# Patient Record
Sex: MALE | Race: BLACK OR AFRICAN AMERICAN | NOT HISPANIC OR LATINO | Employment: UNEMPLOYED | URBAN - METROPOLITAN AREA
[De-identification: names, ages, dates, MRNs, and addresses within clinical notes are randomized per-mention and may not be internally consistent; named-entity substitution may affect disease eponyms.]

---

## 2017-01-06 ENCOUNTER — HISTORICAL (OUTPATIENT)
Dept: LAB | Facility: HOSPITAL | Age: 57
End: 2017-01-06

## 2017-02-27 ENCOUNTER — HISTORICAL (OUTPATIENT)
Dept: SURGERY | Facility: HOSPITAL | Age: 57
End: 2017-02-27

## 2017-03-01 ENCOUNTER — HISTORICAL (OUTPATIENT)
Dept: ANESTHESIOLOGY | Facility: HOSPITAL | Age: 57
End: 2017-03-01

## 2017-06-05 ENCOUNTER — HISTORICAL (OUTPATIENT)
Dept: RADIOLOGY | Facility: HOSPITAL | Age: 57
End: 2017-06-05

## 2019-06-21 ENCOUNTER — HISTORICAL (OUTPATIENT)
Dept: ADMINISTRATIVE | Facility: HOSPITAL | Age: 59
End: 2019-06-21

## 2022-04-11 ENCOUNTER — HISTORICAL (OUTPATIENT)
Dept: ADMINISTRATIVE | Facility: HOSPITAL | Age: 62
End: 2022-04-11

## 2022-04-27 VITALS
WEIGHT: 211.19 LBS | SYSTOLIC BLOOD PRESSURE: 108 MMHG | HEIGHT: 69 IN | DIASTOLIC BLOOD PRESSURE: 74 MMHG | BODY MASS INDEX: 31.28 KG/M2

## 2022-05-01 ENCOUNTER — HOSPITAL ENCOUNTER (EMERGENCY)
Facility: HOSPITAL | Age: 62
Discharge: HOME | End: 2022-05-01
Attending: INTERNAL MEDICINE
Payer: MEDICAID

## 2022-05-01 VITALS
HEART RATE: 78 BPM | BODY MASS INDEX: 26.66 KG/M2 | OXYGEN SATURATION: 99 % | TEMPERATURE: 98 F | SYSTOLIC BLOOD PRESSURE: 115 MMHG | RESPIRATION RATE: 17 BRPM | DIASTOLIC BLOOD PRESSURE: 86 MMHG | WEIGHT: 180 LBS

## 2022-05-01 DIAGNOSIS — R42 DIZZINESS AFTER EXTENSION OF NECK: ICD-10-CM

## 2022-05-01 DIAGNOSIS — L02.215 PERINEAL ABSCESS, SUPERFICIAL: ICD-10-CM

## 2022-05-01 DIAGNOSIS — E86.0 DEHYDRATION: Primary | ICD-10-CM

## 2022-05-01 DIAGNOSIS — E87.6 HYPOKALEMIA: ICD-10-CM

## 2022-05-01 LAB
ALBUMIN SERPL-MCNC: 2.7 GM/DL (ref 3.4–4.8)
ALBUMIN/GLOB SERPL: 0.6 RATIO (ref 1.1–2)
ALP SERPL-CCNC: 83 UNIT/L (ref 40–150)
ALT SERPL-CCNC: 18 UNIT/L (ref 0–55)
AST SERPL-CCNC: 59 UNIT/L (ref 5–34)
BASOPHILS # BLD AUTO: 0.03 X10(3)/MCL (ref 0–0.2)
BASOPHILS NFR BLD AUTO: 0.3 %
BILIRUBIN DIRECT+TOT PNL SERPL-MCNC: 0.5 MG/DL (ref 0–0.8)
BILIRUBIN DIRECT+TOT PNL SERPL-MCNC: 1.1 MG/DL (ref 0–0.5)
BILIRUBIN DIRECT+TOT PNL SERPL-MCNC: 1.6 MG/DL
BUN SERPL-MCNC: 17 MG/DL (ref 8.4–25.7)
CALCIUM SERPL-MCNC: 9.3 MG/DL (ref 8.8–10)
CHLORIDE SERPL-SCNC: 92 MMOL/L (ref 98–107)
CO2 SERPL-SCNC: 28 MMOL/L (ref 23–31)
CREAT SERPL-MCNC: 1.81 MG/DL (ref 0.73–1.18)
EOSINOPHIL # BLD AUTO: 0.14 X10(3)/MCL (ref 0–0.9)
EOSINOPHIL NFR BLD AUTO: 1.6 %
ERYTHROCYTE [DISTWIDTH] IN BLOOD BY AUTOMATED COUNT: 13.1 % (ref 11.5–17)
GLOBULIN SER-MCNC: 4.7 GM/DL (ref 2.4–3.5)
GLUCOSE SERPL-MCNC: 119 MG/DL (ref 82–115)
HCT VFR BLD AUTO: 26.2 % (ref 42–52)
HGB BLD-MCNC: 9.4 GM/DL (ref 14–18)
IMM GRANULOCYTES # BLD AUTO: 0.26 X10(3)/MCL (ref 0–0.02)
IMM GRANULOCYTES NFR BLD AUTO: 3 % (ref 0–0.43)
LACTATE SERPL-SCNC: 1.6 MMOL/L (ref 0.5–2.2)
LYMPHOCYTES # BLD AUTO: 1.29 X10(3)/MCL (ref 0.6–4.6)
LYMPHOCYTES NFR BLD AUTO: 14.6 %
MCH RBC QN AUTO: 37.3 PG (ref 27–31)
MCHC RBC AUTO-ENTMCNC: 35.9 MG/DL (ref 33–36)
MCV RBC AUTO: 104 FL (ref 80–94)
MONOCYTES # BLD AUTO: 0.42 X10(3)/MCL (ref 0.1–1.3)
MONOCYTES NFR BLD AUTO: 4.8 %
NEUTROPHILS # BLD AUTO: 6.7 X10(3)/MCL (ref 2.1–9.2)
NEUTROPHILS NFR BLD AUTO: 75.7 %
PLATELET # BLD AUTO: 309 X10(3)/MCL (ref 130–400)
PMV BLD AUTO: 10.7 FL (ref 9.4–12.4)
POTASSIUM SERPL-SCNC: 2.4 MMOL/L (ref 3.5–5.1)
PROT SERPL-MCNC: 7.4 GM/DL (ref 5.8–7.6)
RBC # BLD AUTO: 2.52 X10(6)/MCL (ref 4.7–6.1)
SODIUM SERPL-SCNC: 136 MMOL/L (ref 136–145)
TROPONIN I SERPL-MCNC: <0.01 NG/ML (ref 0–0.04)
WBC # SPEC AUTO: 8.9 X10(3)/MCL (ref 4.5–11.5)

## 2022-05-01 PROCEDURE — 84484 ASSAY OF TROPONIN QUANT: CPT

## 2022-05-01 PROCEDURE — 25000003 PHARM REV CODE 250: Performed by: INTERNAL MEDICINE

## 2022-05-01 PROCEDURE — 36415 COLL VENOUS BLD VENIPUNCTURE: CPT | Performed by: INTERNAL MEDICINE

## 2022-05-01 PROCEDURE — 85025 COMPLETE CBC W/AUTO DIFF WBC: CPT | Performed by: INTERNAL MEDICINE

## 2022-05-01 PROCEDURE — 99285 EMERGENCY DEPT VISIT HI MDM: CPT | Mod: 25

## 2022-05-01 PROCEDURE — 25500020 PHARM REV CODE 255: Performed by: INTERNAL MEDICINE

## 2022-05-01 PROCEDURE — 96361 HYDRATE IV INFUSION ADD-ON: CPT

## 2022-05-01 PROCEDURE — 87040 BLOOD CULTURE FOR BACTERIA: CPT | Performed by: INTERNAL MEDICINE

## 2022-05-01 PROCEDURE — 83605 ASSAY OF LACTIC ACID: CPT | Performed by: INTERNAL MEDICINE

## 2022-05-01 PROCEDURE — 93005 ELECTROCARDIOGRAM TRACING: CPT

## 2022-05-01 PROCEDURE — 80053 COMPREHEN METABOLIC PANEL: CPT | Performed by: INTERNAL MEDICINE

## 2022-05-01 PROCEDURE — 96360 HYDRATION IV INFUSION INIT: CPT | Mod: 59

## 2022-05-01 PROCEDURE — 84484 ASSAY OF TROPONIN QUANT: CPT | Performed by: INTERNAL MEDICINE

## 2022-05-01 RX ORDER — SODIUM CHLORIDE 9 MG/ML
1000 INJECTION, SOLUTION INTRAVENOUS
Status: COMPLETED | OUTPATIENT
Start: 2022-05-01 | End: 2022-05-01

## 2022-05-01 RX ORDER — DOXYCYCLINE 100 MG/1
100 CAPSULE ORAL 2 TIMES DAILY
Qty: 20 CAPSULE | Refills: 0 | Status: SHIPPED | OUTPATIENT
Start: 2022-05-01 | End: 2022-05-11

## 2022-05-01 RX ORDER — NAPROXEN 500 MG/1
500 TABLET ORAL 2 TIMES DAILY WITH MEALS
Qty: 30 TABLET | Refills: 0 | Status: SHIPPED | OUTPATIENT
Start: 2022-05-01 | End: 2022-05-16

## 2022-05-01 RX ADMIN — SODIUM CHLORIDE 1000 ML: 9 INJECTION, SOLUTION INTRAVENOUS at 01:05

## 2022-05-01 RX ADMIN — SODIUM CHLORIDE 1500 ML: 9 INJECTION, SOLUTION INTRAVENOUS at 12:05

## 2022-05-01 RX ADMIN — POTASSIUM BICARBONATE 40 MEQ: 782 TABLET, EFFERVESCENT ORAL at 12:05

## 2022-05-01 RX ADMIN — SODIUM CHLORIDE 1000 ML: 9 INJECTION, SOLUTION INTRAVENOUS at 11:05

## 2022-05-01 RX ADMIN — IOPAMIDOL 100 ML: 755 INJECTION, SOLUTION INTRAVENOUS at 04:05

## 2022-05-01 NOTE — DISCHARGE INSTRUCTIONS
[facility] Primary Care Providers        [facility]  MD Mary Martinez, MICHOACANO  [address]  [place], [place] [address] [phone] [facility]  Annabella Mosquera NP  [address] [place] [address]  [phone]   [facility]  MICHOACANO Kwok MD Mark Dawson, MD Danielle Duhon, MD  [address] [place] [address]       [phone] [facility]  [address] [place]  [address]  [phone]   [facility]  [address] [facility] [address]  [phone]    [facility]  Mariaelena Simms, MICHOACANO  [address] [place] [address]  [phone] [facility]  Marlen Estrada NP  [address] [place], [place] [address] [phone]   [facility]  MD Tanya Aranda MD, MD Colleen Juarez MD  [address]  [place], [place] [address] [phone] [facility] [facility]  MD Rudy Lyons MD  [address], [place] [address] [phone]   Morris Brown NP  [address], [place] [address] [phone] [facility]  MICHOACANO [place], MICHOACANO  [address] [place], [place]  [phone]   [facility]  MD Ade Reed MD Paul B. Stringfellow, MD  [address] [place] [address]  [phone] [facility]   Tony Zambrano MD  [address] [facility], [place] [address] [phone]   [facility]  [address] [place], [place] [address] [phone]  TOBIAS Gallegos MD  [address] [place], [place] [address] [phone]   [facility]  Annabella Lion NP  [address] [address] [phone] Reji Barth,  NP  [address] [place] [address]  [phone]   [facility]  [address] [place]  [address]  [phone] [facility]  [address]  [place]  [address]  [phone]   [facility]   [address] [place] [address]  [phone] Jonah Haskins MD  [address] [place]  [address] [place] [address]  [phone]   [facility]  [address] [place] [address]  [phone] [facility]  [address] [place]  [address]  [phone]       [facility] Pediatric Care Providers    [facility]  DO Oliverio Bunch MD  [address] [place] [address]  [phone] Kelly Mckeon MD  [address] [place] [address]  [phone]   [facility]  [place]   Rachael Glover, OMA  [address] [place] [address]  [phone] Marisa Thompson MD  [address] [place] [address]  [phone]   Revised /14/2021         You need to see one of the surgeons to reevaluate, do further workup and treatment as needed    Take medicines as prescribed    Talk to your family doctor to refer you to the surgeon.    The exam and treatment you received in Emergency Room was for an urgent problem and NOT INTENDED AS COMPLETE CARE. It is important that you FOLLOW UP with a doctor for ongoing care. If your symptoms become WORSE or you DO NOT IMPROVE and you are unable to reach your health care provider, you should RETURN to the emergency department. The Emergency Room doctor has provided a PRELIMINARY INTERPRETATION of all your STUDIES. A final interpretation may be done after you are discharged. IF A CHANGE in your diagnosis or treatment is needed WE WILL CONTACT YOU. It is critical that we have a CURRENT PHONE NUMBER FOR YOU.    Jonathan Bernardo MD  [phone]    Lynne Bernardo MD  [phone]    Denise Ayers MD.  [phone]

## 2022-05-01 NOTE — ED PROVIDER NOTES
Encounter Date: 5/1/2022       History     Chief Complaint   Patient presents with    Hypotension    Weakness     Pt recently changed diet and continued to take BP medication without checking BP.      Patient with history of hypertension, CVA, surgery on his perineum in January, which according to him had something to do with the prostate, and had an open wound in the perineum which has been bleeding, according to patient yesterday went for fishing and last night started having some dizziness and lightheadedness, and when he stands up, he gets dizzy so he called the ambulance to come to the emergency room.  According to medics his blood pressure was 70 systolic,        Review of patient's allergies indicates:  No Known Allergies  Past Medical History:   Diagnosis Date    Hypertension      Past Surgical History:   Procedure Laterality Date    PROSTATE SURGERY       History reviewed. No pertinent family history.  Social History     Tobacco Use    Smoking status: Current Every Day Smoker     Packs/day: 1.00    Smokeless tobacco: Never Used   Substance Use Topics    Alcohol use: Not Currently     Comment: occasionally     Review of Systems   HENT: Negative for trouble swallowing and voice change.    Eyes: Negative for visual disturbance.   Respiratory: Negative for cough and shortness of breath.    Cardiovascular: Negative for chest pain.   Gastrointestinal: Negative for abdominal pain, diarrhea and vomiting.   Genitourinary: Negative for dysuria and hematuria.   Musculoskeletal: Negative for gait problem.        No Deformity   Skin: Negative for color change and rash.   Neurological: Negative for headaches.   Psychiatric/Behavioral: Negative for behavioral problems and sleep disturbance.   All other systems reviewed and are negative.      Physical Exam     Initial Vitals   BP Pulse Resp Temp SpO2   05/01/22 1016 05/01/22 1016 05/01/22 1016 05/01/22 1020 05/01/22 1016   (!) 76/42 104 16 98.4 °F (36.9 °C) 100 %       MAP       --                Physical Exam    Nursing note and vitals reviewed.  Constitutional: No distress.   HENT:   Head: Atraumatic.   Eyes: EOM are normal.   Cardiovascular: Normal heart sounds.   Pulmonary/Chest: Breath sounds normal.   Abdominal: Abdomen is soft. Bowel sounds are normal.   Genitourinary: Uncircumcised.       Musculoskeletal:         General: Normal range of motion.      Cervical back: No bony tenderness.     Neurological: He is alert.   Speech Normal   Skin: Skin is warm and dry.   Psychiatric: He has a normal mood and affect.   Pleasant         ED Course   Procedures  Labs Reviewed   COMPREHENSIVE METABOLIC PANEL - Abnormal; Notable for the following components:       Result Value    Potassium Level 2.4 (*)     Chloride 92 (*)     Glucose Level 119 (*)     Creatinine 1.81 (*)     Albumin Level 2.7 (*)     Globulin 4.7 (*)     Albumin/Globulin Ratio 0.6 (*)     Bilirubin Total 1.6 (*)     Bilirubin Direct 1.1 (*)     Aspartate Aminotransferase 59 (*)     All other components within normal limits   CBC WITH DIFFERENTIAL - Abnormal; Notable for the following components:    RBC 2.52 (*)     Hgb 9.4 (*)     Hct 26.2 (*)     .0 (*)     MCH 37.3 (*)     IG# 0.26 (*)     IG% 3.0 (*)     All other components within normal limits   TROPONIN I - Normal   LACTIC ACID, PLASMA - Normal   BLOOD CULTURE OLG   BLOOD CULTURE OLG   CBC W/ AUTO DIFFERENTIAL    Narrative:     The following orders were created for panel order CBC auto differential.  Procedure                               Abnormality         Status                     ---------                               -----------         ------                     CBC with Differential[[id number]]        Abnormal            Edited Result - FINAL        Please view results for these tests on the individual orders.   CREATININE, SERUM     EKG Readings: (Independently Interpreted)   Initial Reading: No STEMI. Rhythm: Normal Sinus Rhythm. Heart Rate:  76. Ectopy: No Ectopy. Conduction: Normal. Axis: Normal.       Imaging Results          CT Abdomen Pelvis With Contrast (Final result)  Result time 05/01/22 16:08:32    Final result by Chip Aceves MD (05/01/22 16:08:32)                 Impression:      1. Bladder wall thickening.  Differential considerations include physiologic underdistention of the bladder, cystitis and chronic obstruction.  2. No other evidence of acute abdominopelvic process.      Electronically signed by: Chip Aceves MD  Date:    05/01/2022  Time:    16:08             Narrative:    EXAMINATION:  CT ABDOMEN PELVIS WITH CONTRAST    CLINICAL HISTORY:  Abdominal abscess    TECHNIQUE:  Axial CT images were obtained through the abdomen and pelvis after the IV administration of 100 mL of Isovue 370. coronal and sagittal reconstructions submitted and interpreted.  Automated exposure control, dose radiation lowering technique, was utilized.    COMPARISON:  CT abdomen and pelvis from 09/10/2019    FINDINGS:  Heart size is normal.  There is scattered dependent atelectasis and/or scarring.  Chronic right lateral rib fractures are present.  Unremarkable gallbladder.  The liver, spleen, pancreas, adrenal glands and kidneys appear normal.    The bowel is nonobstructed.  A normal appendix is present with air at the tip of the appendix.  Air and stool are present within the colon.  There is scattered colonic diverticuli.  No diverticulitis.    No abdominal or pelvic lymphadenopathy is present.  Mild atherosclerosis of the thoracic aorta and its branches.  No ascites.  No suspicious peritoneal nodule.  There is mild, diffuse bladder wall thickening.  This may be partly secondary to under distention of the bladder.  No suspicious osteolytic or osteoblastic lesion is present.                                 Medications   0.9%  NaCl infusion (0 mLs Intravenous Stopped 5/1/22 1255)   sodium chloride 0.9% bolus 2,448 mL (0 mLs Intravenous Stopped 5/1/22  1341)   potassium bicarbonate disintegrating tablet 40 mEq (40 mEq Oral Given 5/1/22 1244)   0.9%  NaCl infusion (0 mLs Intravenous Stopped 5/1/22 1639)   iopamidoL (ISOVUE-370) injection 100 mL (100 mLs Intravenous Given 5/1/22 1602)                 ED Course as of 05/01/22 1645   Sun May 01, 2022   1238 Patient's blood pressure had come up, he is responding to fluid bolus, his white blood cell count is normal, his chemistry showed hyponatremia, he is getting 30 mL/kg bolus, lactic acid is normal,     [GQ]   1628 Patient feels better, is not feeling dizzy anymore, apparently the opening in the perineum has been followed up a couple of months now for a couple of months he had surgery and had I and D done, and it does not show any signs of any major infection at this time, CT scan does not show any other acute abnormality in the area except for that in duration which I am noting on examination, I will go ahead and put him on antibiotic doxycycline 100 mg twice a day and I have advised him that he must talk to his family doctor about further evaluation and possibly he should go and see the physician/surgeon who did the I and D on him further evaluation.  Patient verbalizes understanding and is willing to go home [GQ]   1633 . [GQ]      ED Course User Index  [GQ] Donovan Barraza MD             Clinical Impression:   Final diagnoses:  [R42] Dizziness after extension of neck  [E86.0] Dehydration (Primary)  [E87.6] Hypokalemia  [L02.215] Perineal abscess, superficial          ED Disposition Condition    Discharge Stable        ED Prescriptions     Medication Sig Dispense Start Date End Date Auth. Provider    naproxen (NAPROSYN) 500 MG tablet Take 1 tablet (500 mg total) by mouth 2 (two) times daily with meals. for 15 days 30 tablet 5/1/2022 5/16/2022 Donovan Barraza MD    doxycycline (VIBRAMYCIN) 100 MG Cap Take 1 capsule (100 mg total) by mouth 2 (two) times daily. for 10 days 20 capsule 5/1/2022 5/11/2022 Donovan MATOS  MD Madi        Follow-up Information    None          Donovan Barraza MD  05/01/22 6379
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
## 2022-05-08 LAB
BACTERIA BLD CULT: NORMAL
BACTERIA BLD CULT: NORMAL

## 2022-09-08 ENCOUNTER — HOSPITAL ENCOUNTER (OUTPATIENT)
Facility: HOSPITAL | Age: 62
Discharge: HOME | End: 2022-09-09
Attending: FAMILY MEDICINE | Admitting: INTERNAL MEDICINE
Payer: COMMERCIAL

## 2022-09-08 DIAGNOSIS — Z00.00 ROUTINE CHECK-UP: ICD-10-CM

## 2022-09-08 DIAGNOSIS — H53.9 VISION ABNORMALITIES: ICD-10-CM

## 2022-09-08 DIAGNOSIS — I63.9 ACUTE CVA (CEREBROVASCULAR ACCIDENT): Primary | ICD-10-CM

## 2022-09-08 DIAGNOSIS — R07.9 CHEST PAIN: ICD-10-CM

## 2022-09-08 DIAGNOSIS — I10 HYPERTENSION: ICD-10-CM

## 2022-09-08 PROBLEM — E53.8 FOLIC ACID DEFICIENCY: Status: ACTIVE | Noted: 2021-03-11

## 2022-09-08 PROBLEM — E78.49 OTHER HYPERLIPIDEMIA: Status: ACTIVE | Noted: 2021-03-17

## 2022-09-08 PROBLEM — E53.8 VITAMIN B12 DEFICIENCY: Status: ACTIVE | Noted: 2021-03-11

## 2022-09-08 PROBLEM — H54.62 VISION LOSS OF LEFT EYE: Status: ACTIVE | Noted: 2021-03-17

## 2022-09-08 LAB
ALBUMIN SERPL-MCNC: 4.1 GM/DL (ref 3.4–4.8)
ALBUMIN/GLOB SERPL: 1.1 RATIO (ref 1.1–2)
ALP SERPL-CCNC: 68 UNIT/L (ref 40–150)
ALT SERPL-CCNC: 15 UNIT/L (ref 0–55)
APPEARANCE UR: CLEAR
AST SERPL-CCNC: 20 UNIT/L (ref 5–34)
BACTERIA #/AREA URNS AUTO: ABNORMAL /HPF
BASOPHILS # BLD AUTO: 0.05 X10(3)/MCL (ref 0–0.2)
BASOPHILS NFR BLD AUTO: 0.8 %
BILIRUB UR QL STRIP.AUTO: NEGATIVE MG/DL
BILIRUBIN DIRECT+TOT PNL SERPL-MCNC: 0.6 MG/DL
BUN SERPL-MCNC: 14.7 MG/DL (ref 8.4–25.7)
CALCIUM SERPL-MCNC: 10.4 MG/DL (ref 8.8–10)
CHLORIDE SERPL-SCNC: 101 MMOL/L (ref 98–107)
CO2 SERPL-SCNC: 28 MMOL/L (ref 23–31)
COLOR UR AUTO: YELLOW
CREAT SERPL-MCNC: 0.89 MG/DL (ref 0.73–1.18)
EOSINOPHIL # BLD AUTO: 0.37 X10(3)/MCL (ref 0–0.9)
EOSINOPHIL NFR BLD AUTO: 5.7 %
ERYTHROCYTE [DISTWIDTH] IN BLOOD BY AUTOMATED COUNT: 13.2 % (ref 11.5–17)
EST. AVERAGE GLUCOSE BLD GHB EST-MCNC: 116.9 MG/DL
FOLATE SERPL-MCNC: 9.5 NG/ML (ref 7–31.4)
GFR SERPLBLD CREATININE-BSD FMLA CKD-EPI: >60 MLS/MIN/1.73/M2
GLOBULIN SER-MCNC: 3.8 GM/DL (ref 2.4–3.5)
GLUCOSE SERPL-MCNC: 92 MG/DL (ref 82–115)
GLUCOSE UR QL STRIP.AUTO: NORMAL MG/DL
HBA1C MFR BLD: 5.7 %
HCT VFR BLD AUTO: 43.4 % (ref 42–52)
HGB BLD-MCNC: 14.2 GM/DL (ref 14–18)
HIV 1+2 AB+HIV1 P24 AG SERPL QL IA: NONREACTIVE
HYALINE CASTS #/AREA URNS LPF: ABNORMAL /LPF
IMM GRANULOCYTES # BLD AUTO: 0.02 X10(3)/MCL (ref 0–0.04)
IMM GRANULOCYTES NFR BLD AUTO: 0.3 %
KETONES UR QL STRIP.AUTO: NEGATIVE MG/DL
LACTATE SERPL-SCNC: 1.5 MMOL/L (ref 0.5–2.2)
LEUKOCYTE ESTERASE UR QL STRIP.AUTO: 250 UNIT/L
LYMPHOCYTES # BLD AUTO: 2.81 X10(3)/MCL (ref 0.6–4.6)
LYMPHOCYTES NFR BLD AUTO: 43.2 %
MCH RBC QN AUTO: 31.2 PG (ref 27–31)
MCHC RBC AUTO-ENTMCNC: 32.7 MG/DL (ref 33–36)
MCV RBC AUTO: 95.4 FL (ref 80–94)
MONOCYTES # BLD AUTO: 0.87 X10(3)/MCL (ref 0.1–1.3)
MONOCYTES NFR BLD AUTO: 13.4 %
MUCOUS THREADS URNS QL MICRO: ABNORMAL /LPF
NEUTROPHILS # BLD AUTO: 2.4 X10(3)/MCL (ref 2.1–9.2)
NEUTROPHILS NFR BLD AUTO: 36.6 %
NITRITE UR QL STRIP.AUTO: NEGATIVE
NRBC BLD AUTO-RTO: 0 %
PH UR STRIP.AUTO: 5.5 [PH]
PLATELET # BLD AUTO: 180 X10(3)/MCL (ref 130–400)
PLATELETS.RETICULATED NFR BLD AUTO: 14.6 % (ref 0.9–11.2)
PMV BLD AUTO: 12.8 FL (ref 7.4–10.4)
POTASSIUM SERPL-SCNC: 5 MMOL/L (ref 3.5–5.1)
PROT SERPL-MCNC: 7.9 GM/DL (ref 5.8–7.6)
PROT UR QL STRIP.AUTO: NEGATIVE MG/DL
RBC # BLD AUTO: 4.55 X10(6)/MCL (ref 4.7–6.1)
RBC #/AREA URNS AUTO: ABNORMAL /HPF
RBC UR QL AUTO: NEGATIVE UNIT/L
SODIUM SERPL-SCNC: 139 MMOL/L (ref 136–145)
SP GR UR STRIP.AUTO: 1.03
SQUAMOUS #/AREA URNS LPF: ABNORMAL /HPF
T PALLIDUM AB SER QL: NONREACTIVE
T4 FREE SERPL-MCNC: 0.73 NG/DL (ref 0.7–1.48)
TSH SERPL-ACNC: 0.82 UIU/ML (ref 0.35–4.94)
UROBILINOGEN UR STRIP-ACNC: NORMAL MG/DL
VIT B12 SERPL-MCNC: 428 PG/ML (ref 213–816)
WBC # SPEC AUTO: 6.5 X10(3)/MCL (ref 4.5–11.5)
WBC #/AREA URNS AUTO: ABNORMAL /HPF

## 2022-09-08 PROCEDURE — 25000003 PHARM REV CODE 250

## 2022-09-08 PROCEDURE — 85025 COMPLETE CBC W/AUTO DIFF WBC: CPT | Performed by: FAMILY MEDICINE

## 2022-09-08 PROCEDURE — 82607 VITAMIN B-12: CPT

## 2022-09-08 PROCEDURE — 83605 ASSAY OF LACTIC ACID: CPT

## 2022-09-08 PROCEDURE — 84439 ASSAY OF FREE THYROXINE: CPT

## 2022-09-08 PROCEDURE — 99285 EMERGENCY DEPT VISIT HI MDM: CPT | Mod: 25

## 2022-09-08 PROCEDURE — 36415 COLL VENOUS BLD VENIPUNCTURE: CPT | Performed by: FAMILY MEDICINE

## 2022-09-08 PROCEDURE — 87088 URINE BACTERIA CULTURE: CPT | Performed by: FAMILY MEDICINE

## 2022-09-08 PROCEDURE — 80053 COMPREHEN METABOLIC PANEL: CPT | Performed by: FAMILY MEDICINE

## 2022-09-08 PROCEDURE — G0378 HOSPITAL OBSERVATION PER HR: HCPCS

## 2022-09-08 PROCEDURE — 93005 ELECTROCARDIOGRAM TRACING: CPT

## 2022-09-08 PROCEDURE — 87389 HIV-1 AG W/HIV-1&-2 AB AG IA: CPT

## 2022-09-08 PROCEDURE — 83036 HEMOGLOBIN GLYCOSYLATED A1C: CPT

## 2022-09-08 PROCEDURE — 80061 LIPID PANEL: CPT

## 2022-09-08 PROCEDURE — 84443 ASSAY THYROID STIM HORMONE: CPT

## 2022-09-08 PROCEDURE — 25500020 PHARM REV CODE 255

## 2022-09-08 PROCEDURE — 84100 ASSAY OF PHOSPHORUS: CPT

## 2022-09-08 PROCEDURE — 96372 THER/PROPH/DIAG INJ SC/IM: CPT

## 2022-09-08 PROCEDURE — 86780 TREPONEMA PALLIDUM: CPT

## 2022-09-08 PROCEDURE — 63600175 PHARM REV CODE 636 W HCPCS

## 2022-09-08 PROCEDURE — 81001 URINALYSIS AUTO W/SCOPE: CPT | Performed by: FAMILY MEDICINE

## 2022-09-08 PROCEDURE — 82746 ASSAY OF FOLIC ACID SERUM: CPT

## 2022-09-08 RX ORDER — IBUPROFEN 200 MG
24 TABLET ORAL
Status: DISCONTINUED | OUTPATIENT
Start: 2022-09-08 | End: 2022-09-09 | Stop reason: HOSPADM

## 2022-09-08 RX ORDER — LISINOPRIL 10 MG/1
40 TABLET ORAL DAILY
Status: DISCONTINUED | OUTPATIENT
Start: 2022-09-09 | End: 2022-09-08

## 2022-09-08 RX ORDER — HYDROCHLOROTHIAZIDE 12.5 MG/1
12.5 CAPSULE ORAL DAILY
Status: ON HOLD | COMMUNITY
Start: 2022-06-15 | End: 2022-09-09 | Stop reason: SDUPTHER

## 2022-09-08 RX ORDER — ATORVASTATIN CALCIUM 40 MG/1
1 TABLET, FILM COATED ORAL DAILY
COMMUNITY
Start: 2022-06-15 | End: 2022-09-08 | Stop reason: SDUPTHER

## 2022-09-08 RX ORDER — ENOXAPARIN SODIUM 100 MG/ML
40 INJECTION SUBCUTANEOUS EVERY 24 HOURS
Status: DISCONTINUED | OUTPATIENT
Start: 2022-09-08 | End: 2022-09-09 | Stop reason: HOSPADM

## 2022-09-08 RX ORDER — HYDROCHLOROTHIAZIDE 12.5 MG/1
12.5 TABLET ORAL DAILY
Status: DISCONTINUED | OUTPATIENT
Start: 2022-09-08 | End: 2022-09-08

## 2022-09-08 RX ORDER — ASPIRIN 325 MG
325 TABLET ORAL ONCE
Status: COMPLETED | OUTPATIENT
Start: 2022-09-08 | End: 2022-09-08

## 2022-09-08 RX ORDER — LISINOPRIL 10 MG/1
40 TABLET ORAL DAILY
Status: DISCONTINUED | OUTPATIENT
Start: 2022-09-08 | End: 2022-09-09 | Stop reason: HOSPADM

## 2022-09-08 RX ORDER — HYDRALAZINE HYDROCHLORIDE 20 MG/ML
10 INJECTION INTRAMUSCULAR; INTRAVENOUS
Status: DISCONTINUED | OUTPATIENT
Start: 2022-09-08 | End: 2022-09-09 | Stop reason: HOSPADM

## 2022-09-08 RX ORDER — METOPROLOL TARTRATE 25 MG/1
25 TABLET, FILM COATED ORAL 2 TIMES DAILY
Status: DISCONTINUED | OUTPATIENT
Start: 2022-09-08 | End: 2022-09-09 | Stop reason: HOSPADM

## 2022-09-08 RX ORDER — LISINOPRIL 40 MG/1
1 TABLET ORAL DAILY
Status: ON HOLD | COMMUNITY
Start: 2022-06-15 | End: 2022-09-09 | Stop reason: SDUPTHER

## 2022-09-08 RX ORDER — NAPROXEN SODIUM 220 MG/1
81 TABLET, FILM COATED ORAL DAILY
Qty: 30 TABLET | Refills: 3 | Status: SHIPPED | OUTPATIENT
Start: 2022-09-09 | End: 2022-09-09 | Stop reason: SDUPTHER

## 2022-09-08 RX ORDER — METOPROLOL SUCCINATE 25 MG/1
25 TABLET, EXTENDED RELEASE ORAL DAILY
Status: DISCONTINUED | OUTPATIENT
Start: 2022-09-09 | End: 2022-09-08

## 2022-09-08 RX ORDER — NALOXONE HCL 0.4 MG/ML
0.02 VIAL (ML) INJECTION
Status: DISCONTINUED | OUTPATIENT
Start: 2022-09-08 | End: 2022-09-09 | Stop reason: HOSPADM

## 2022-09-08 RX ORDER — METOPROLOL TARTRATE 25 MG/1
25 TABLET, FILM COATED ORAL 2 TIMES DAILY
Qty: 60 TABLET | Refills: 11 | Status: SHIPPED | OUTPATIENT
Start: 2022-09-08 | End: 2022-09-09 | Stop reason: SDUPTHER

## 2022-09-08 RX ORDER — ACETAMINOPHEN 325 MG/1
650 TABLET ORAL ONCE
Status: COMPLETED | OUTPATIENT
Start: 2022-09-08 | End: 2022-09-08

## 2022-09-08 RX ORDER — ATORVASTATIN CALCIUM 40 MG/1
40 TABLET, FILM COATED ORAL DAILY
Status: DISCONTINUED | OUTPATIENT
Start: 2022-09-08 | End: 2022-09-09 | Stop reason: HOSPADM

## 2022-09-08 RX ORDER — ATORVASTATIN CALCIUM 40 MG/1
40 TABLET, FILM COATED ORAL DAILY
Status: ON HOLD | COMMUNITY
Start: 2022-06-15 | End: 2022-09-09 | Stop reason: SDUPTHER

## 2022-09-08 RX ORDER — NAPROXEN SODIUM 220 MG/1
81 TABLET, FILM COATED ORAL DAILY
Status: DISCONTINUED | OUTPATIENT
Start: 2022-09-09 | End: 2022-09-09 | Stop reason: HOSPADM

## 2022-09-08 RX ORDER — SODIUM CHLORIDE 0.9 % (FLUSH) 0.9 %
10 SYRINGE (ML) INJECTION EVERY 12 HOURS PRN
Status: DISCONTINUED | OUTPATIENT
Start: 2022-09-08 | End: 2022-09-09 | Stop reason: HOSPADM

## 2022-09-08 RX ORDER — GLUCAGON 1 MG
1 KIT INJECTION
Status: DISCONTINUED | OUTPATIENT
Start: 2022-09-08 | End: 2022-09-09 | Stop reason: HOSPADM

## 2022-09-08 RX ORDER — HYDROCHLOROTHIAZIDE 12.5 MG/1
12.5 TABLET ORAL DAILY
Status: DISCONTINUED | OUTPATIENT
Start: 2022-09-09 | End: 2022-09-08

## 2022-09-08 RX ORDER — HYDROCHLOROTHIAZIDE 12.5 MG/1
12.5 TABLET ORAL DAILY
Status: DISCONTINUED | OUTPATIENT
Start: 2022-09-08 | End: 2022-09-09 | Stop reason: HOSPADM

## 2022-09-08 RX ORDER — AMLODIPINE BESYLATE 10 MG/1
10 TABLET ORAL DAILY
Status: DISCONTINUED | OUTPATIENT
Start: 2022-09-09 | End: 2022-09-08

## 2022-09-08 RX ORDER — AMLODIPINE BESYLATE 10 MG/1
10 TABLET ORAL DAILY
Qty: 30 TABLET | Refills: 11 | Status: SHIPPED | OUTPATIENT
Start: 2022-09-09 | End: 2022-09-09 | Stop reason: SDUPTHER

## 2022-09-08 RX ORDER — AMLODIPINE BESYLATE 10 MG/1
10 TABLET ORAL DAILY
Status: DISCONTINUED | OUTPATIENT
Start: 2022-09-08 | End: 2022-09-09 | Stop reason: HOSPADM

## 2022-09-08 RX ORDER — IBUPROFEN 200 MG
16 TABLET ORAL
Status: DISCONTINUED | OUTPATIENT
Start: 2022-09-08 | End: 2022-09-09 | Stop reason: HOSPADM

## 2022-09-08 RX ADMIN — ASPIRIN 325 MG ORAL TABLET 325 MG: 325 PILL ORAL at 01:09

## 2022-09-08 RX ADMIN — METOPROLOL TARTRATE 25 MG: 25 TABLET, FILM COATED ORAL at 08:09

## 2022-09-08 RX ADMIN — AMLODIPINE BESYLATE 10 MG: 10 TABLET ORAL at 03:09

## 2022-09-08 RX ADMIN — LISINOPRIL 40 MG: 10 TABLET ORAL at 03:09

## 2022-09-08 RX ADMIN — ATORVASTATIN CALCIUM 40 MG: 40 TABLET, FILM COATED ORAL at 03:09

## 2022-09-08 RX ADMIN — IOHEXOL 100 ML: 350 INJECTION, SOLUTION INTRAVENOUS at 02:09

## 2022-09-08 RX ADMIN — ACETAMINOPHEN 650 MG: 325 TABLET ORAL at 08:09

## 2022-09-08 RX ADMIN — HYDROCHLOROTHIAZIDE 12.5 MG: 12.5 TABLET ORAL at 03:09

## 2022-09-08 RX ADMIN — ENOXAPARIN SODIUM 40 MG: 40 INJECTION SUBCUTANEOUS at 05:09
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
## 2022-09-08 NOTE — CONSULTS
"Consultation Report  Ophthalmology Service    Date: 09/08/2022    Chief complaint/Reason for Consult: headache with blurry vision OD     History of Present Illness: Paresh Shelton is a 61 y.o. male with history of remote CVA in 3/2021, blind left eye. He presents with right sided headache and blurry vision in the right eye. He reports 2 days of symptoms, describing a blurry "shade" over his visual field of the right eye.    Patient denies any flashes, floaters in visual field, and ocular discomfort OU.    POcularHx: Denies past ocular surgeries. 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    Current eye gtts: Denies     Family Hx: Denies family history of glaucoma, macular degeneration, or blindness. family history is not on file.     PMHx:  has no past medical history on file.     PSurgHx:  has no past surgical history on file.     Home Medications:   Prior to Admission medications    Medication Sig Start Date End Date Taking? Authorizing Provider   lisinopriL (PRINIVIL,ZESTRIL) 40 MG tablet Take 1 tablet by mouth once daily. 6/15/22  Yes Historical Provider   atorvastatin (LIPITOR) 40 MG tablet Take 1 tablet by mouth once daily. 6/15/22 9/8/22 Yes Historical Provider   atorvastatin (LIPITOR) 40 MG tablet Take 40 mg by mouth once daily. 6/15/22   Historical Provider   hydroCHLOROthiazide (MICROZIDE) 12.5 mg capsule Take 12.5 mg by mouth once daily. 6/15/22   Historical Provider        Medications this encounter:    amLODIPine  10 mg Oral Daily    [START ON 9/9/2022] aspirin  81 mg Oral Daily    atorvastatin  40 mg Oral Daily    enoxaparin  40 mg Subcutaneous Daily    hydroCHLOROthiazide  12.5 mg Oral Daily    lisinopriL  40 mg Oral Daily    metoprolol tartrate  25 mg Oral BID       Allergies: has No Known Allergies.     Social:  reports that he has been smoking cigarettes. He has never used smokeless tobacco.     ROS: As per HPI    Ocular examination/Dilated " fundus examination:  Base Eye Exam       Visual Acuity (Snellen - Linear)         Right Left    Dist sc 20/40 +1 NLP              Tonometry (Tonopen, 5:41 PM)         Right Left    Pressure 12 14              Pupils         Dark Light Shape React APD    Right 2 1 Round Brisk None    Left 2 2 Round Minimal 4+              Visual Fields         Right Left    Restrictions Total superior nasal, inferior nasal deficiencies Total superior temporal, inferior temporal, superior nasal, inferior nasal deficiencies              Extraocular Movement         Right Left     Full Full   Mild sensory XT OS             Dilation       Both eyes: 1% Mydriacyl, 2.5% Phenylephrine @ 5:42 PM                  Slit Lamp and Fundus Exam       External Exam         Right Left    External Normal Normal              Pen Light Exam         Right Left    Lids/Lashes Normal Normal    Conjunctiva/Sclera CAM CAM    Cornea Clear Clear    Anterior Chamber Deep and formed Deep and formed    Iris Round and reactive Round and reactive    Lens Clear Clear    Anterior Vitreous Normal Normal              Fundus Exam         Right Left    Disc pink and sharp 3-4+ pallor    C/D Ratio 0.7 0.9    Macula Flat Flat, atrophied    Vessels tortuous highly attenuated    Periphery Flat 360, no holes/tears/detachments Flat 360, no holes/tears/detachments. Atrophy.                  CT Head wo Contrast (9/8/22):  Impression (radiologist read): Prominent remote appearing lacunar infarcts of the basal ganglia.  Small remote cortical infarct right occipital lobe.  No definite acute intracranial abnormality.    CTA Head and Neck (9/8/22):  FINDINGS:  Carotid arteries are assessed in accordance with the NASCET criteria.  Aortic arch is remarkable for mild calcified plaques without aneurysmal dilatation or dissection.  The brachiocephalic trunk is patent.  There is mild calcified plaque at the origin ofleft subclavian artery without flow-limiting stenosis.  Right subclavian  artery is not well seen due to artifacts.  There is unremarkable flow throughout the right common carotid artery without stenosis, dissection or intraluminal thrombus.  There is mild narrowing of the right carotid bulb caused by mixed calcified and noncalcified plaques.  There is also calcified plaque which involves the distal left common carotid artery and the proximal left internal carotid artery causing up to 30% stenosis.  Bilateral cavernous portion of the internal carotid arteries are remarkable for mild to moderate calcified plaques.  There is unremarkable flow bilaterally within middle cerebral arteries, anterior cerebral artery and the major branches.  The left vertebral artery is hypoplastic.  There is flow bilaterally within the vertebral arteries and the basilar artery.  Flow is also present bilaterally within the posterior cerebral arteries.  Metallic density projects over the left upper anterior chest.  There are old fractures of the right ribs.    Impression:  No hemodynamically significant flow-limiting stenosis identified.      Assessment/Plan:     1. Blurry Vision, OD  - Patient complains of 2 days of headache associated with blurry peripheral vision in the right eye.  - Patient exhibits NLP OS, as well as left field hemianopia of the right eye on confrontational fields, consistent to remote cortical infarct of right occipital lobe (noted on CT Head).  - On exam, patient is noticing a left hemifield deficit consistent with an old right occipital lobe stroke.  - Patient appears to be just now noticing an existing left hemifield deficit, assuming that the right occipital lobe infarct is old. Would recommend speaking to radiology to ensure that this lesion does not appear new, and finish stroke work up per primary team. Consider consulting neurology if in need of further recommendations.    2. Blind Left Eye  - Uncertain etiology per patient, but history and previous notes suggest possible CARRERA.  -  Patient's description of history of immediately NLP after waking up from sleep more consistent with ACRRERA.  - Exam reveals retinal atrophy and pale optic disc.    3. History of CVA  - Head imaging reveals multiple remote infarcts, though patient denies having any residual sensorimotor deficits. Left hemifield defects consistent with history of right occipital stroke.    4. Glaucoma Suspect, OD  - based on large CDR in right eye, monocular patient.  - Will work up glaucoma in clinic, determine if there is any thinning.        Will try and schedule for outpatient follow up in clinic:  - OCT Mac and RNFL  - CCT  - MRx  - HVF 24-2 OD      Vicente Orona MD PGY-3  [employer] Ophthalmology Resident  09/08/2022  5:37 PM

## 2022-09-08 NOTE — PLAN OF CARE
Problem: Adult Inpatient Plan of Care  Goal: Plan of Care Review  Outcome: Ongoing, Progressing  Flowsheets (Taken 9/8/2022 9963)  Plan of Care Reviewed With: patient  Goal: Patient-Specific Goal (Individualized)  Outcome: Ongoing, Progressing  Goal: Absence of Hospital-Acquired Illness or Injury  Outcome: Ongoing, Progressing  Goal: Optimal Comfort and Wellbeing  Outcome: Ongoing, Progressing  Goal: Readiness for Transition of Care  Outcome: Ongoing, Progressing

## 2022-09-09 VITALS
HEIGHT: 73 IN | WEIGHT: 196.19 LBS | SYSTOLIC BLOOD PRESSURE: 103 MMHG | HEART RATE: 84 BPM | TEMPERATURE: 98 F | RESPIRATION RATE: 18 BRPM | DIASTOLIC BLOOD PRESSURE: 70 MMHG | OXYGEN SATURATION: 96 % | BODY MASS INDEX: 26 KG/M2

## 2022-09-09 PROBLEM — E53.8 VITAMIN B12 DEFICIENCY: Status: RESOLVED | Noted: 2021-03-11 | Resolved: 2022-09-09

## 2022-09-09 PROBLEM — H53.9 VISION ABNORMALITIES: Status: ACTIVE | Noted: 2022-09-09

## 2022-09-09 PROBLEM — E53.8 FOLIC ACID DEFICIENCY: Status: RESOLVED | Noted: 2021-03-11 | Resolved: 2022-09-09

## 2022-09-09 LAB
ALBUMIN SERPL-MCNC: 3.6 GM/DL (ref 3.4–4.8)
ALBUMIN/GLOB SERPL: 0.9 RATIO (ref 1.1–2)
ALP SERPL-CCNC: 64 UNIT/L (ref 40–150)
ALT SERPL-CCNC: 15 UNIT/L (ref 0–55)
AST SERPL-CCNC: 19 UNIT/L (ref 5–34)
AV INDEX (PROSTH): 0.85
AV MEAN GRADIENT: 3 MMHG
AV PEAK GRADIENT: 5 MMHG
AV VALVE AREA: 2.67 CM2
AV VELOCITY RATIO: 0.94
BILIRUBIN DIRECT+TOT PNL SERPL-MCNC: 0.7 MG/DL
BSA FOR ECHO PROCEDURE: 2.14 M2
BUN SERPL-MCNC: 14.6 MG/DL (ref 8.4–25.7)
CALCIUM SERPL-MCNC: 9.8 MG/DL (ref 8.8–10)
CHLORIDE SERPL-SCNC: 99 MMOL/L (ref 98–107)
CHOLEST SERPL-MCNC: 272 MG/DL
CHOLEST/HDLC SERPL: 7 {RATIO} (ref 0–5)
CO2 SERPL-SCNC: 28 MMOL/L (ref 23–31)
CREAT SERPL-MCNC: 0.87 MG/DL (ref 0.73–1.18)
CRP SERPL-MCNC: 6.2 MG/L
CV ECHO LV RWT: 0.49 CM
DOP CALC AO PEAK VEL: 1.09 M/S
DOP CALC AO VTI: 26.2 CM
DOP CALC LVOT AREA: 3.1 CM2
DOP CALC LVOT DIAMETER: 2 CM
DOP CALC LVOT PEAK VEL: 1.02 M/S
DOP CALC LVOT STROKE VOLUME: 70.02 CM3
DOP CALC MV VTI: 31.6 CM
DOP CALCLVOT PEAK VEL VTI: 22.3 CM
E WAVE DECELERATION TIME: 205.29 MSEC
E/A RATIO: 0.98
ECHO LV POSTERIOR WALL: 1.08 CM (ref 0.6–1.1)
EJECTION FRACTION: 55 %
ERYTHROCYTE [SEDIMENTATION RATE] IN BLOOD: 20 MM/HR (ref 0–15)
FRACTIONAL SHORTENING: 30 % (ref 28–44)
GFR SERPLBLD CREATININE-BSD FMLA CKD-EPI: >60 MLS/MIN/1.73/M2
GLOBULIN SER-MCNC: 3.8 GM/DL (ref 2.4–3.5)
GLUCOSE SERPL-MCNC: 87 MG/DL (ref 82–115)
HDLC SERPL-MCNC: 40 MG/DL (ref 35–60)
HR MV ECHO: 59 BPM
INTERVENTRICULAR SEPTUM: 1 CM (ref 0.6–1.1)
LDLC SERPL CALC-MCNC: 174 MG/DL (ref 50–140)
LEFT ATRIUM SIZE: 3.43 CM
LEFT ATRIUM VOLUME INDEX MOD: 17.8 ML/M2
LEFT ATRIUM VOLUME MOD: 38 CM3
LEFT INTERNAL DIMENSION IN SYSTOLE: 3.07 CM (ref 2.1–4)
LEFT VENTRICLE DIASTOLIC VOLUME INDEX: 41.46 ML/M2
LEFT VENTRICLE DIASTOLIC VOLUME: 88.32 ML
LEFT VENTRICLE MASS INDEX: 74 G/M2
LEFT VENTRICLE SYSTOLIC VOLUME INDEX: 17.4 ML/M2
LEFT VENTRICLE SYSTOLIC VOLUME: 37.09 ML
LEFT VENTRICULAR INTERNAL DIMENSION IN DIASTOLE: 4.41 CM (ref 3.5–6)
LEFT VENTRICULAR MASS: 156.68 G
LV LATERAL E/E' RATIO: 8.13 M/S
LVOT MG: 2.05 MMHG
LVOT MV: 0.67 CM/S
MAGNESIUM SERPL-MCNC: 1.8 MG/DL (ref 1.6–2.6)
MV MEAN GRADIENT: 1 MMHG
MV PEAK A VEL: 0.66 M/S
MV PEAK E VEL: 0.65 M/S
MV PEAK GRADIENT: 3 MMHG
MV VALVE AREA BY CONTINUITY EQUATION: 2.22 CM2
PHOSPHATE SERPL-MCNC: 4.7 MG/DL (ref 2.3–4.7)
PISA MRMAX VEL: 6.66 M/S
PISA TR MAX VEL: 2.81 M/S
POTASSIUM SERPL-SCNC: 4.1 MMOL/L (ref 3.5–5.1)
PROT SERPL-MCNC: 7.4 GM/DL (ref 5.8–7.6)
RA PRESSURE: 3 MMHG
RA WIDTH: 4.7 CM
SODIUM SERPL-SCNC: 136 MMOL/L (ref 136–145)
TDI LATERAL: 0.08 M/S
TR MAX PG: 32 MMHG
TRICUSPID ANNULAR PLANE SYSTOLIC EXCURSION: 2.09 CM
TRIGL SERPL-MCNC: 289 MG/DL (ref 34–140)
TV REST PULMONARY ARTERY PRESSURE: 35 MMHG
VLDLC SERPL CALC-MCNC: 58 MG/DL

## 2022-09-09 PROCEDURE — 86140 C-REACTIVE PROTEIN: CPT | Performed by: INTERNAL MEDICINE

## 2022-09-09 PROCEDURE — 80053 COMPREHEN METABOLIC PANEL: CPT

## 2022-09-09 PROCEDURE — G0378 HOSPITAL OBSERVATION PER HR: HCPCS

## 2022-09-09 PROCEDURE — 25000003 PHARM REV CODE 250

## 2022-09-09 PROCEDURE — 85651 RBC SED RATE NONAUTOMATED: CPT | Performed by: INTERNAL MEDICINE

## 2022-09-09 PROCEDURE — 83735 ASSAY OF MAGNESIUM: CPT

## 2022-09-09 PROCEDURE — 36415 COLL VENOUS BLD VENIPUNCTURE: CPT | Performed by: INTERNAL MEDICINE

## 2022-09-09 RX ORDER — AMLODIPINE BESYLATE 10 MG/1
10 TABLET ORAL DAILY
Qty: 30 TABLET | Refills: 5 | Status: SHIPPED | OUTPATIENT
Start: 2022-09-09 | End: 2022-09-09 | Stop reason: SDUPTHER

## 2022-09-09 RX ORDER — ACETAMINOPHEN 325 MG/1
650 TABLET ORAL EVERY 8 HOURS PRN
Status: DISCONTINUED | OUTPATIENT
Start: 2022-09-09 | End: 2022-09-09 | Stop reason: HOSPADM

## 2022-09-09 RX ORDER — NAPROXEN SODIUM 220 MG/1
81 TABLET, FILM COATED ORAL DAILY
Qty: 30 TABLET | Refills: 4 | Status: SHIPPED | OUTPATIENT
Start: 2022-09-09 | End: 2022-09-09 | Stop reason: SDUPTHER

## 2022-09-09 RX ORDER — HYDROCHLOROTHIAZIDE 12.5 MG/1
12.5 CAPSULE ORAL DAILY
Qty: 90 CAPSULE | Refills: 4 | Status: SHIPPED | OUTPATIENT
Start: 2022-09-09 | End: 2022-09-09 | Stop reason: SDUPTHER

## 2022-09-09 RX ORDER — NAPROXEN SODIUM 220 MG/1
81 TABLET, FILM COATED ORAL DAILY
Qty: 30 TABLET | Refills: 3 | Status: SHIPPED | OUTPATIENT
Start: 2022-09-09 | End: 2022-09-09 | Stop reason: SDUPTHER

## 2022-09-09 RX ORDER — AMLODIPINE BESYLATE 10 MG/1
10 TABLET ORAL DAILY
Qty: 30 TABLET | Refills: 5 | Status: SHIPPED | OUTPATIENT
Start: 2022-09-09 | End: 2023-09-09

## 2022-09-09 RX ORDER — HYDROCHLOROTHIAZIDE 12.5 MG/1
12.5 CAPSULE ORAL DAILY
Qty: 90 CAPSULE | Refills: 5 | Status: SHIPPED | OUTPATIENT
Start: 2022-09-09

## 2022-09-09 RX ORDER — AMLODIPINE BESYLATE 10 MG/1
10 TABLET ORAL DAILY
Qty: 30 TABLET | Refills: 4 | Status: SHIPPED | OUTPATIENT
Start: 2022-09-09 | End: 2022-09-09 | Stop reason: SDUPTHER

## 2022-09-09 RX ORDER — ACETAMINOPHEN 500 MG
1000 TABLET ORAL ONCE
Status: COMPLETED | OUTPATIENT
Start: 2022-09-09 | End: 2022-09-09

## 2022-09-09 RX ORDER — ACETAMINOPHEN 500 MG
1000 TABLET ORAL EVERY 8 HOURS PRN
Qty: 120 TABLET | Refills: 5 | Status: SHIPPED | OUTPATIENT
Start: 2022-09-09 | End: 2023-01-07

## 2022-09-09 RX ORDER — LISINOPRIL 40 MG/1
40 TABLET ORAL DAILY
Qty: 90 TABLET | Refills: 5 | Status: SHIPPED | OUTPATIENT
Start: 2022-09-09 | End: 2022-12-08

## 2022-09-09 RX ORDER — LISINOPRIL 40 MG/1
40 TABLET ORAL DAILY
Qty: 90 TABLET | Refills: 4 | Status: SHIPPED | OUTPATIENT
Start: 2022-09-09 | End: 2022-09-09 | Stop reason: SDUPTHER

## 2022-09-09 RX ORDER — ATORVASTATIN CALCIUM 40 MG/1
40 TABLET, FILM COATED ORAL DAILY
Qty: 90 TABLET | Refills: 6 | Status: SHIPPED | OUTPATIENT
Start: 2022-09-09

## 2022-09-09 RX ORDER — METOPROLOL TARTRATE 25 MG/1
25 TABLET, FILM COATED ORAL 2 TIMES DAILY
Qty: 60 TABLET | Refills: 4 | Status: SHIPPED | OUTPATIENT
Start: 2022-09-09 | End: 2023-09-09

## 2022-09-09 RX ORDER — NAPROXEN SODIUM 220 MG/1
81 TABLET, FILM COATED ORAL DAILY
Qty: 30 TABLET | Refills: 5 | Status: SHIPPED | OUTPATIENT
Start: 2022-09-09 | End: 2023-09-09

## 2022-09-09 RX ORDER — ATORVASTATIN CALCIUM 40 MG/1
40 TABLET, FILM COATED ORAL DAILY
Qty: 90 TABLET | Refills: 5 | Status: SHIPPED | OUTPATIENT
Start: 2022-09-09 | End: 2022-09-09 | Stop reason: SDUPTHER

## 2022-09-09 RX ORDER — METOPROLOL TARTRATE 25 MG/1
25 TABLET, FILM COATED ORAL 2 TIMES DAILY
Qty: 60 TABLET | Refills: 5 | Status: SHIPPED | OUTPATIENT
Start: 2022-09-09 | End: 2022-09-09 | Stop reason: SDUPTHER

## 2022-09-09 RX ADMIN — ATORVASTATIN CALCIUM 40 MG: 40 TABLET, FILM COATED ORAL at 08:09

## 2022-09-09 RX ADMIN — ASPIRIN 81 MG: 81 TABLET, CHEWABLE ORAL at 08:09

## 2022-09-09 RX ADMIN — ACETAMINOPHEN 650 MG: 325 TABLET ORAL at 08:09

## 2022-09-09 RX ADMIN — ACETAMINOPHEN 1000 MG: 500 TABLET ORAL at 03:09

## 2022-09-09 RX ADMIN — LISINOPRIL 40 MG: 10 TABLET ORAL at 08:09

## 2022-09-09 RX ADMIN — AMLODIPINE BESYLATE 10 MG: 10 TABLET ORAL at 08:09

## 2022-09-09 RX ADMIN — METOPROLOL TARTRATE 25 MG: 25 TABLET, FILM COATED ORAL at 08:09

## 2022-09-09 RX ADMIN — HYDROCHLOROTHIAZIDE 12.5 MG: 12.5 TABLET ORAL at 08:09
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
## 2022-09-09 NOTE — PLAN OF CARE
Problem: Adult Inpatient Plan of Care  Goal: Plan of Care Review  Outcome: Ongoing, Progressing  Goal: Patient-Specific Goal (Individualized)  Outcome: Ongoing, Progressing  Goal: Absence of Hospital-Acquired Illness or Injury  Outcome: Ongoing, Progressing  Goal: Optimal Comfort and Wellbeing  Outcome: Ongoing, Progressing  Goal: Readiness for Transition of Care  Outcome: Ongoing, Progressing     Problem: Hypertension Acute  Goal: Blood Pressure Within Desired Range  Outcome: Ongoing, Progressing

## 2022-09-09 NOTE — PLAN OF CARE
Problem: Adult Inpatient Plan of Care  Goal: Plan of Care Review  Outcome: Adequate for Care Transition  Goal: Patient-Specific Goal (Individualized)  Outcome: Adequate for Care Transition  Goal: Absence of Hospital-Acquired Illness or Injury  Outcome: Adequate for Care Transition  Goal: Optimal Comfort and Wellbeing  Outcome: Adequate for Care Transition  Goal: Readiness for Transition of Care  Outcome: Adequate for Care Transition     Problem: Hypertension Acute  Goal: Blood Pressure Within Desired Range  Outcome: Adequate for Care Transition

## 2022-09-10 LAB — BACTERIA UR CULT: NO GROWTH

## 2022-09-13 LAB
LEFT CBA DIAS: 24 CM/S
LEFT CBA SYS: 105 CM/S
LEFT CCA DIST DIAS: 18 CM/S
LEFT CCA DIST SYS: 85 CM/S
LEFT CCA MID DIAS: 12 CM/S
LEFT CCA MID SYS: 77 CM/S
LEFT CCA PROX DIAS: 20 CM/S
LEFT CCA PROX SYS: 108 CM/S
LEFT ECA DIAS: 15 CM/S
LEFT ECA SYS: 147 CM/S
LEFT ICA DIST DIAS: 19 CM/S
LEFT ICA DIST SYS: 85 CM/S
LEFT ICA MID DIAS: 21 CM/S
LEFT ICA MID SYS: 92 CM/S
LEFT ICA PROX DIAS: 13 CM/S
LEFT ICA PROX SYS: 55 CM/S
LEFT VERTEBRAL DIAS: 9 CM/S
LEFT VERTEBRAL SYS: 49 CM/S
OHS CV CAROTID RIGHT ICA EDV HIGHEST: 18
OHS CV CAROTID ULTRASOUND LEFT ICA/CCA RATIO: 1.08
OHS CV CAROTID ULTRASOUND RIGHT ICA/CCA RATIO: 1.03
OHS CV PV CAROTID LEFT HIGHEST CCA: 108
OHS CV PV CAROTID LEFT HIGHEST ICA: 92
OHS CV PV CAROTID RIGHT HIGHEST CCA: 136
OHS CV PV CAROTID RIGHT HIGHEST ICA: 81
OHS CV US CAROTID LEFT HIGHEST EDV: 21
RIGHT CBA DIAS: 13 CM/S
RIGHT CBA SYS: 54 CM/S
RIGHT CCA DIST DIAS: 13 CM/S
RIGHT CCA DIST SYS: 79 CM/S
RIGHT CCA MID DIAS: 14 CM/S
RIGHT CCA MID SYS: 95 CM/S
RIGHT CCA PROX DIAS: 21 CM/S
RIGHT CCA PROX SYS: 136 CM/S
RIGHT ECA DIAS: 0 CM/S
RIGHT ECA SYS: 94 CM/S
RIGHT ICA DIST DIAS: 16 CM/S
RIGHT ICA DIST SYS: 48 CM/S
RIGHT ICA MID DIAS: 13 CM/S
RIGHT ICA MID SYS: 81 CM/S
RIGHT ICA PROX DIAS: 18 CM/S
RIGHT ICA PROX SYS: 79 CM/S
RIGHT VERTEBRAL DIAS: 10 CM/S
RIGHT VERTEBRAL SYS: 49 CM/S

## 2022-09-29 DIAGNOSIS — H54.62 VISION LOSS OF LEFT EYE: Primary | ICD-10-CM

## 2022-12-12 PROBLEM — I63.9 ISCHEMIC CEREBROVASCULAR ACCIDENT (CVA) OF FRONTAL LOBE: Status: RESOLVED | Noted: 2021-03-09 | Resolved: 2022-12-12

## 2024-06-18 ENCOUNTER — HOSPITAL ENCOUNTER (EMERGENCY)
Facility: HOSPITAL | Age: 64
Discharge: HOME | End: 2024-06-18
Attending: FAMILY MEDICINE
Payer: MEDICAID

## 2024-06-18 VITALS
HEIGHT: 72 IN | HEART RATE: 80 BPM | OXYGEN SATURATION: 100 % | TEMPERATURE: 98 F | SYSTOLIC BLOOD PRESSURE: 115 MMHG | RESPIRATION RATE: 20 BRPM | BODY MASS INDEX: 26.82 KG/M2 | DIASTOLIC BLOOD PRESSURE: 82 MMHG | WEIGHT: 198 LBS

## 2024-06-18 DIAGNOSIS — R10.2 PERINEUM PAIN, MALE: Primary | ICD-10-CM

## 2024-06-18 DIAGNOSIS — N50.819 TESTICLE PAIN: ICD-10-CM

## 2024-06-18 LAB
BACTERIA #/AREA URNS AUTO: ABNORMAL /HPF
BILIRUB UR QL STRIP.AUTO: ABNORMAL
CLARITY UR: ABNORMAL
COLOR UR AUTO: ABNORMAL
GLUCOSE UR QL STRIP: NEGATIVE
HGB UR QL STRIP: NEGATIVE
KETONES UR QL STRIP: ABNORMAL
LEUKOCYTE ESTERASE UR QL STRIP: ABNORMAL
NITRITE UR QL STRIP: NEGATIVE
PH UR STRIP: 6 [PH]
PROT UR QL STRIP: NEGATIVE
RBC #/AREA URNS AUTO: ABNORMAL /HPF
SP GR UR STRIP.AUTO: 1.02 (ref 1–1.03)
SQUAMOUS #/AREA URNS AUTO: ABNORMAL /HPF
UROBILINOGEN UR STRIP-ACNC: >=8
WBC #/AREA URNS AUTO: ABNORMAL /HPF

## 2024-06-18 PROCEDURE — 99284 EMERGENCY DEPT VISIT MOD MDM: CPT | Mod: 25

## 2024-06-18 PROCEDURE — 81001 URINALYSIS AUTO W/SCOPE: CPT | Performed by: FAMILY MEDICINE

## 2024-06-18 PROCEDURE — 81003 URINALYSIS AUTO W/O SCOPE: CPT | Performed by: FAMILY MEDICINE

## 2024-06-18 RX ORDER — NAPROXEN 500 MG/1
500 TABLET ORAL 2 TIMES DAILY WITH MEALS
Qty: 60 TABLET | Refills: 0 | Status: SHIPPED | OUTPATIENT
Start: 2024-06-18

## 2024-06-18 NOTE — ED PROVIDER NOTES
Encounter Date: 6/18/2024       History     Chief Complaint   Patient presents with    Testicle Pain     C/o mid testicular pain starting yesterday. States he feels a lump also.      Patient complains of pain at the bottom of his left testicle.  Present for 2 days.  Started after he fell on that area.  When he shows with the pain is really more on the bottom of the scrotum/perineum.  No urinary complaints.        Review of patient's allergies indicates:  No Known Allergies  History reviewed. No pertinent past medical history.  History reviewed. No pertinent surgical history.  No family history on file.  Social History     Tobacco Use    Smoking status: Every Day     Types: Cigarettes    Smokeless tobacco: Never     Review of Systems   All other systems reviewed and are negative.      Physical Exam     Initial Vitals [06/18/24 1338]   BP Pulse Resp Temp SpO2   111/74 82 18 98.1 °F (36.7 °C) 99 %      MAP       --         Physical Exam    Nursing note and vitals reviewed.  Constitutional: He appears well-developed and well-nourished.   HENT:   Head: Normocephalic and atraumatic.   Eyes: Conjunctivae are normal.   Neck: Neck supple.   Cardiovascular:  Normal rate and regular rhythm.           Pulmonary/Chest: Breath sounds normal.   Abdominal: Abdomen is soft.   Tenderness palpation over the left anterior perineum no testicular tenderness.  Normal penis.   Musculoskeletal:      Cervical back: Neck supple.     Neurological: He is alert and oriented to person, place, and time.   Skin: Skin is warm.   Psychiatric: He has a normal mood and affect. Thought content normal.         ED Course   Procedures  Labs Reviewed   URINALYSIS, REFLEX TO URINE CULTURE - Abnormal; Notable for the following components:       Result Value    Appearance, UA Cloudy (*)     Ketones, UA Trace (*)     Bilirubin, UA 1+ (*)     Urobilinogen, UA >=8.0 (*)     Leukocyte Esterase, UA 1+ (*)     All other components within normal limits   URINALYSIS,  MICROSCOPIC - Abnormal; Notable for the following components:    WBC, UA 6-10 (*)     All other components within normal limits          Imaging Results              US Scrotum And Testicles (Final result)  Result time 06/18/24 15:44:09      Final result by Stu Lucero MD (06/18/24 15:44:09)                   Impression:      1. No evidence of testicular torsion  2. Left varicocele  3. Subcentimeter cyst at the right and left epididymis as described      Electronically signed by: Stu Lucero  Date:    06/18/2024  Time:    15:44               Narrative:    EXAMINATION:  ULTRASOUND OF THE TESTICLES:    CLINICAL HISTORY:  Testicular pain, unspecified,    COMPARISON:  None available    FINDINGS:  Ultrasound Doppler and color flow imaging was performed. Multiple sonographic images reveal the testicles to be relatively symmetric in size and shape. No evidence of testicular torsion is identified. A 0.9 cm cyst noted to the right epididymis.  A 0.6 cm cyst noted to the left epididymis.  A left varicocele is identified.    Right testicle: 2.9 x 1.9 x 3.0 cm    Left testicle: 2.8 x 1.9 x 3.0 cm                                       Medications - No data to display  Medical Decision Making  Amount and/or Complexity of Data Reviewed  Radiology: ordered.                                      Clinical Impression:  Final diagnoses:  [N50.819] Testicle pain  [R10.2] Perineum pain, male (Primary)          ED Disposition Condition    Discharge Stable          ED Prescriptions       Medication Sig Dispense Start Date End Date Auth. Provider    naproxen (NAPROSYN) 500 MG tablet Take 1 tablet (500 mg total) by mouth 2 (two) times daily with meals. 60 tablet 6/18/2024 -- Ayaan Walton Jr., MD          Follow-up Information       Follow up With Specialties Details Why Contact Info    Karthikeyan Fountain MD Family Medicine   [address] [place] [id number]  [phone]               Ayaan Walton Jr.,  MD  06/18/24 2868

## 2024-07-04 ENCOUNTER — HOSPITAL ENCOUNTER (EMERGENCY)
Facility: HOSPITAL | Age: 64
Discharge: HOME | End: 2024-07-04
Attending: STUDENT IN AN ORGANIZED HEALTH CARE EDUCATION/TRAINING PROGRAM
Payer: MEDICAID

## 2024-07-04 VITALS
HEART RATE: 74 BPM | HEIGHT: 73 IN | SYSTOLIC BLOOD PRESSURE: 131 MMHG | RESPIRATION RATE: 16 BRPM | OXYGEN SATURATION: 98 % | DIASTOLIC BLOOD PRESSURE: 80 MMHG | BODY MASS INDEX: 25.18 KG/M2 | WEIGHT: 190 LBS | TEMPERATURE: 98 F

## 2024-07-04 DIAGNOSIS — S00.83XA FACIAL CONTUSION, INITIAL ENCOUNTER: ICD-10-CM

## 2024-07-04 DIAGNOSIS — H11.31 SUBCONJUNCTIVAL HEMORRHAGE OF RIGHT EYE: ICD-10-CM
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
## 2024-07-04 PROCEDURE — 99284 EMERGENCY DEPT VISIT MOD MDM: CPT | Mod: 25

## 2024-07-04 NOTE — ED PROVIDER NOTES
Encounter Date: 7/4/2024       History     Chief Complaint   Patient presents with    Fall     Fall last night   struck face and head         HPI    63-year-old male with a past medical history of hypertension presents emergency department for right-sided facial swelling.  Patient states that he slipped on his wet floor last night hitting his face against a wall and then the floor.  Denies loss of consciousness.  States that he hit with significant force.  Mild headache.  No blood thinners.  Denies any pain with eye movement.  States that the right side of his face around his right eye swollen up this morning.    Review of patient's allergies indicates:  No Known Allergies  No past medical history on file.  No past surgical history on file.  No family history on file.  Social History     Tobacco Use    Smoking status: Every Day     Types: Cigarettes    Smokeless tobacco: Never     Review of Systems   Constitutional:  Negative for fever.   HENT:  Positive for facial swelling.    Respiratory:  Negative for cough and shortness of breath.    Cardiovascular:  Negative for chest pain.   Gastrointestinal:  Negative for abdominal pain, constipation, diarrhea, nausea and vomiting.   Genitourinary:  Negative for dysuria.   Musculoskeletal:  Negative for back pain.   Skin:  Negative for rash.   Neurological:  Positive for headaches. Negative for weakness.   Hematological:  Does not bruise/bleed easily.   All other systems reviewed and are negative.      Physical Exam     Initial Vitals [07/04/24 1051]   BP Pulse Resp Temp SpO2   135/89 86 16 98.1 °F (36.7 °C) 96 %      MAP       --         Physical Exam    Nursing note and vitals reviewed.  Constitutional: He appears well-developed and well-nourished. No distress.   HENT:   Significant swelling about the right side of the face periorbital.   Eyes: EOM are normal. Pupils are equal, round, and reactive to light.   Subconjunctival hemorrhage of right eye   Neck: Neck supple.    Cardiovascular:  Normal rate and regular rhythm.           Pulmonary/Chest: Breath sounds normal. No respiratory distress.   Abdominal: Abdomen is soft. There is no abdominal tenderness.   Musculoskeletal:         General: No tenderness. Normal range of motion.      Cervical back: Neck supple.     Neurological: He is alert and oriented to person, place, and time.   Skin: Skin is warm. Capillary refill takes less than 2 seconds.         ED Course   Procedures  Labs Reviewed - No data to display       Imaging Results              CT Head Without Contrast (Preliminary result)  Result time 07/04/24 12:24:33      Wet Read by Santiago Garcia MD (07/04/24 12:24:33, [facility] - Emergency Dept, Emergency Medicine)    No acute intracranial abnormalities appreciated                                     CT Maxillofacial Without Contrast (Preliminary result)  Result time 07/04/24 12:24:23      Wet Read by Santiago Garcia MD (07/04/24 12:24:23, [facility] - Emergency Dept, Emergency Medicine)    No acute fractures appreciated.  Contain hematoma with soft tissue swelling to the right periorbital region                                     CT Cervical Spine Without Contrast (Preliminary result)  Result time 07/04/24 12:23:26      Wet Read by Santiago Garcia MD (07/04/24 12:23:26, [facility] - Emergency Dept, Emergency Medicine)    No acute fractures or malalignments appreciated                                     Medications - No data to display  Medical Decision Making  Initial Assessment:   Fall    Differential Diagnosis:   Judging by the patient's chief complaint and pertinent history, the patient has the following possible differential diagnoses, including but not limited to the following.  Some of these are deemed to be lower likelihood and some more likely based on my physical exam and history combined with possible lab work and/or imaging studies.   Please see the pertinent  studies, and refer to the HPI.  Some of these diagnoses will take further evaluation to fully rule out, perhaps as an outpatient and the patient was encouraged to follow up when discharged for more comprehensive evaluation.  Fall, facial fracture, contusion, head injury, C-spine injury,  as well as multiple other possible etiologies      Problems Addressed:  Facial contusion, initial encounter: acute illness or injury  Fall, initial encounter: acute illness or injury  Subconjunctival hemorrhage of right eye: acute illness or injury    Amount and/or Complexity of Data Reviewed  Radiology: ordered and independent interpretation performed.               ED Course as of 07/04/24 1226   Thu Jul 04, 2024   1224 I had a long discussion with the patient and family regards to an ID issue resulting in radiologist's inability to see imaging.  They state understanding and wished to be discharged.  I have reviewed the images and I do not see anything acute.  I informed them that I am by no means a radiologist but if they or comfortable going home it is reasonable.  We do have a phone number to get in contact with them if something does result abnormal [BS]      ED Course User Index  [BS] Santiago Garcia MD                           Clinical Impression:  Final diagnoses:  [W19.XXXA] Fall, initial encounter (Primary)  [S00.83XA] Facial contusion, initial encounter  [H11.31] Subconjunctival hemorrhage of right eye          ED Disposition Condition    Discharge Stable          ED Prescriptions    None       Follow-up Information       Follow up With Specialties Details Why Contact Info    Karthikeyan Fountain MD Family Medicine Schedule an appointment as soon as possible for a visit   [address] [place] [id number]  [phone]      [facility] - Emergency Dept Emergency Medicine Go to  If symptoms worsen [address] [facility] [address]  [phone]             Santiago Garcia,  MD  07/04/24 3154